# Patient Record
Sex: MALE | Race: BLACK OR AFRICAN AMERICAN | NOT HISPANIC OR LATINO | Employment: OTHER | URBAN - METROPOLITAN AREA
[De-identification: names, ages, dates, MRNs, and addresses within clinical notes are randomized per-mention and may not be internally consistent; named-entity substitution may affect disease eponyms.]

---

## 2018-11-28 ENCOUNTER — HOSPITAL ENCOUNTER (EMERGENCY)
Facility: OTHER | Age: 53
Discharge: HOME | End: 2018-11-28
Attending: EMERGENCY MEDICINE
Payer: MEDICAID

## 2018-11-28 VITALS
TEMPERATURE: 99 F | BODY MASS INDEX: 27.47 KG/M2 | SYSTOLIC BLOOD PRESSURE: 150 MMHG | RESPIRATION RATE: 20 BRPM | DIASTOLIC BLOOD PRESSURE: 78 MMHG | OXYGEN SATURATION: 97 % | HEART RATE: 109 BPM | HEIGHT: 67 IN | WEIGHT: 175 LBS

## 2018-11-28 DIAGNOSIS — K02.9 DENTAL DECAY: ICD-10-CM

## 2018-11-28 DIAGNOSIS — K08.89 PAIN, DENTAL: Primary | ICD-10-CM

## 2018-11-28 PROCEDURE — 25000003 PHARM REV CODE 250: Performed by: PHYSICIAN ASSISTANT

## 2018-11-28 PROCEDURE — 99284 EMERGENCY DEPT VISIT MOD MDM: CPT

## 2018-11-28 RX ORDER — PENICILLIN V POTASSIUM 500 MG/1
500 TABLET, FILM COATED ORAL 4 TIMES DAILY
Qty: 28 TABLET | Refills: 0 | Status: SHIPPED | OUTPATIENT
Start: 2018-11-28 | End: 2018-12-05

## 2018-11-28 RX ORDER — PENICILLIN V POTASSIUM 500 MG/1
500 TABLET, FILM COATED ORAL
Status: COMPLETED | OUTPATIENT
Start: 2018-11-28 | End: 2018-11-28

## 2018-11-28 RX ORDER — CHLORHEXIDINE GLUCONATE ORAL RINSE 1.2 MG/ML
15 SOLUTION DENTAL 2 TIMES DAILY
Qty: 420 ML | Refills: 0 | Status: SHIPPED | OUTPATIENT
Start: 2018-11-28 | End: 2018-12-12

## 2018-11-28 RX ORDER — IBUPROFEN 400 MG/1
800 TABLET ORAL
Status: COMPLETED | OUTPATIENT
Start: 2018-11-28 | End: 2018-11-28

## 2018-11-28 RX ORDER — IBUPROFEN 800 MG/1
800 TABLET ORAL EVERY 6 HOURS PRN
Qty: 20 TABLET | Refills: 0 | Status: SHIPPED | OUTPATIENT
Start: 2018-11-28

## 2018-11-28 RX ADMIN — IBUPROFEN 800 MG: 400 TABLET, FILM COATED ORAL at 04:11

## 2018-11-28 RX ADMIN — PENICILLIN V POTASIUM 500 MG: 500 TABLET OROPHARYNGEAL at 04:11

## 2018-11-28 NOTE — ED TRIAGE NOTES
"Pt reports pain to R upper teeth. "it feels like someone is hammering them." pt states he has been eating and drinking with no difficulty.   "

## 2018-11-28 NOTE — ED PROVIDER NOTES
"Encounter Date: 11/28/2018       History     Chief Complaint   Patient presents with    Dental Pain     Pt c/o upper right dental pain stating "My tooth is rotten"     Patient is a 53-year-old male presenting to the emergency department with complaints of dental pain.  The patient reports that he has had worsening dental pain for approximately 1 week.  He states on the right upper side.  He reports that his teeth are rotting.  He admits that he is having normal p.o. intake and states that he has been applying BC powder to the area topically.  He states he has not been able to make an appointment with the dentist yet.  He admits that he has never been seen by a dentist in the past.  No difficulty swallowing or handling oral secretions. This is the extent of the patient's complaints at this time.         The history is provided by the patient.     Review of patient's allergies indicates:  No Known Allergies  History reviewed. No pertinent past medical history.  History reviewed. No pertinent surgical history.  No family history on file.  Social History     Tobacco Use    Smoking status: Current Every Day Smoker     Packs/day: 0.50    Smokeless tobacco: Current User   Substance Use Topics    Alcohol use: No     Frequency: Never    Drug use: Not on file     Review of Systems   Constitutional: Negative for activity change, chills, fatigue and fever.   HENT: Positive for dental problem. Negative for congestion, rhinorrhea and sore throat.    Eyes: Negative for photophobia and visual disturbance.   Respiratory: Negative for cough and shortness of breath.    Cardiovascular: Negative for chest pain.   Gastrointestinal: Negative for abdominal pain, diarrhea, nausea and vomiting.   Genitourinary: Negative for dysuria, hematuria and urgency.   Musculoskeletal: Negative for back pain, myalgias and neck pain.   Skin: Negative for color change and wound.   Neurological: Negative for weakness and headaches. "   Psychiatric/Behavioral: Negative for agitation and confusion.       Physical Exam     Initial Vitals [11/28/18 1556]   BP Pulse Resp Temp SpO2   (!) 143/81 (!) 112 18 98.4 °F (36.9 °C) 98 %      MAP       --         Physical Exam    Nursing note and vitals reviewed.  Constitutional: Vital signs are normal. He appears well-developed and well-nourished. He is not diaphoretic.  Non-toxic appearance. He does not have a sickly appearance. He does not appear ill. No distress.   Well-appearing,  male accompanied the emergency department.  Speaking clearly full sentences.  No acute distress.   HENT:   Head: Normocephalic and atraumatic.   Right Ear: External ear normal.   Left Ear: External ear normal.   Nose: Nose normal.   Mouth/Throat: Oropharynx is clear and moist.   Significant diffuse dental decay especially noted on the right upper side.  Two missing teeth with surrounding gingival erythema.  Gingival disease noted.  Swallowing handling oral secretions.  No palpable abscess.  No lingual elevation or trismus.   Eyes: Conjunctivae and EOM are normal.   Neck: Normal range of motion. Neck supple.   Cardiovascular: Normal rate, regular rhythm and normal heart sounds.   Pulmonary/Chest: Breath sounds normal. No respiratory distress. He has no wheezes.   Musculoskeletal: Normal range of motion.   Neurological: He is alert and oriented to person, place, and time.   Skin: Skin is warm.   Psychiatric: He has a normal mood and affect. His behavior is normal. Judgment and thought content normal.         ED Course   Procedures  Labs Reviewed - No data to display        Medical Decision Making:   Initial Assessment:   Urgent evaluation of a 53-year-old male presenting to the emergency department with complaints of right upper dental pain.  Patient is afebrile, nontoxic appearing, hemodynamically stable. Physical exam reveals multiple dental caries with significant dental decay.  Tenderness to palpation of the  right upper gingiva with missing teeth noted. Swallowing handling oral secretions. There are no signs of Jesus Alberto's angina, lingual elevation, sublingual swelling, facial swelling, airway compromise, facial cellulitis, sepsis, dehydration, or a fluctuant abscess to drain.      ED Management:  Patient is started on penicillin and ibuprofen in the ED given a prescription for the same medications well as well as Peridex.  Counseled on the importance of obtaining close follow-up with dentist.  Of note, on arrival to the ED the patient was tachycardia 112 beats per minute. Upon my examination, this had decreased, repeat heart rate was 109.  Patient was discharged home in stable condition.The patient was instructed to follow up with a primary care provider in 2 days or to return to the emergency department for worsening symptoms. The treatment plan was discussed with the patient who demonstrated understanding and comfort with plan. The patient's history, physical exam, and plan of care was discussed with and agreed upon with my supervising physician.     This note was created using [employer] Direct. There may be typographical errors secondary to dictation.                         Clinical Impression:     1. Pain, dental    2. Dental decay           Disposition:   Disposition: Discharged  Condition: Stable                        Lily Sykes PA-C  11/28/18 4263